# Patient Record
Sex: FEMALE | URBAN - METROPOLITAN AREA
[De-identification: names, ages, dates, MRNs, and addresses within clinical notes are randomized per-mention and may not be internally consistent; named-entity substitution may affect disease eponyms.]

---

## 2022-07-29 ENCOUNTER — APPOINTMENT (RX ONLY)
Dept: URBAN - METROPOLITAN AREA CLINIC 93 | Facility: CLINIC | Age: 72
Setting detail: DERMATOLOGY
End: 2022-07-29

## 2022-07-29 DIAGNOSIS — Z41.9 ENCOUNTER FOR PROCEDURE FOR PURPOSES OTHER THAN REMEDYING HEALTH STATE, UNSPECIFIED: ICD-10-CM

## 2022-07-29 PROCEDURE — ? IPL COSMETIC

## 2022-07-29 ASSESSMENT — LOCATION DETAILED DESCRIPTION DERM
LOCATION DETAILED: RIGHT CENTRAL MALAR CHEEK
LOCATION DETAILED: LEFT INFERIOR CENTRAL MALAR CHEEK
LOCATION DETAILED: RIGHT MEDIAL FOREHEAD
LOCATION DETAILED: LEFT LOWER CUTANEOUS LIP

## 2022-07-29 ASSESSMENT — LOCATION SIMPLE DESCRIPTION DERM
LOCATION SIMPLE: RIGHT FOREHEAD
LOCATION SIMPLE: RIGHT CHEEK
LOCATION SIMPLE: LEFT CHEEK
LOCATION SIMPLE: LEFT LIP

## 2022-07-29 ASSESSMENT — LOCATION ZONE DERM
LOCATION ZONE: LIP
LOCATION ZONE: FACE

## 2022-07-29 NOTE — PROCEDURE: IPL COSMETIC
Add Additional Location: No
Pulse Duration (Include Units): 30 ms
Cooling: 5c
Spotsize (Include Units): 40 ms
Fluence (Include Units): 28j/cm^2
Pre-Procedure Care: Prior to the procedure the patient and all present had protective eyewear in place and a warning sign was placed on the door.
Detail Level: Zone
End-Point And Post-Procedure Care: The procedure continued until mild purpura was noted. Post care reviewed with patient.
Number Of Passes: 1
Ipl Type: MaxG HP
Consent: Prior to the procedure consent obtained, risks reviewed including but not limited to crusting, scabbing, blistering, scarring, darker or lighter pigmentary change, incidental hair removal, bruising, and/or incomplete removal.
Eye Protection: Laser Aid
Pulse Duration (Include Units): 20
Post-Care Instructions: I reviewed with the patient in detail post-care instructions. Patient should stay away from the sun and wear sun protection until treated areas are healed. Applied SPF post tx.\\n\\n\\n\\n\\n**** patient was nervous today and had a little bit of self christopher on. I used alcohol wipes to get christopher off, and treated more conservatively today. Patient understands why.
Price (Use Numbers Only, No Special Characters Or $): 0
Fluence (Include Units): 32j/cm^2
Number Of Passes: 1-2
Fluence (Include Units): 32j